# Patient Record
Sex: FEMALE | Race: WHITE | HISPANIC OR LATINO | URBAN - METROPOLITAN AREA
[De-identification: names, ages, dates, MRNs, and addresses within clinical notes are randomized per-mention and may not be internally consistent; named-entity substitution may affect disease eponyms.]

---

## 2017-10-24 ENCOUNTER — EMERGENCY (EMERGENCY)
Facility: HOSPITAL | Age: 27
LOS: 0 days | Discharge: HOME | End: 2017-10-24
Admitting: INTERNAL MEDICINE

## 2017-10-24 DIAGNOSIS — N89.8 OTHER SPECIFIED NONINFLAMMATORY DISORDERS OF VAGINA: ICD-10-CM

## 2017-10-24 DIAGNOSIS — R10.2 PELVIC AND PERINEAL PAIN: ICD-10-CM

## 2017-10-24 DIAGNOSIS — Z97.5 PRESENCE OF (INTRAUTERINE) CONTRACEPTIVE DEVICE: ICD-10-CM

## 2017-12-26 ENCOUNTER — OUTPATIENT (OUTPATIENT)
Dept: OUTPATIENT SERVICES | Facility: HOSPITAL | Age: 27
LOS: 1 days | Discharge: HOME | End: 2017-12-26

## 2017-12-26 DIAGNOSIS — R55 SYNCOPE AND COLLAPSE: ICD-10-CM

## 2017-12-26 DIAGNOSIS — R94.31 ABNORMAL ELECTROCARDIOGRAM [ECG] [EKG]: ICD-10-CM

## 2018-05-08 ENCOUNTER — EMERGENCY (EMERGENCY)
Facility: HOSPITAL | Age: 28
LOS: 0 days | Discharge: HOME | End: 2018-05-08
Attending: EMERGENCY MEDICINE | Admitting: EMERGENCY MEDICINE

## 2018-05-08 VITALS
OXYGEN SATURATION: 98 % | RESPIRATION RATE: 18 BRPM | SYSTOLIC BLOOD PRESSURE: 101 MMHG | HEART RATE: 59 BPM | TEMPERATURE: 99 F | DIASTOLIC BLOOD PRESSURE: 54 MMHG

## 2018-05-08 DIAGNOSIS — S06.0X0A CONCUSSION WITHOUT LOSS OF CONSCIOUSNESS, INITIAL ENCOUNTER: ICD-10-CM
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
## 2018-05-08 DIAGNOSIS — F32.9 MAJOR DEPRESSIVE DISORDER, SINGLE EPISODE, UNSPECIFIED: ICD-10-CM
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
## 2018-05-08 DIAGNOSIS — R55 SYNCOPE AND COLLAPSE: ICD-10-CM
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
## 2018-05-08 LAB
ANION GAP SERPL CALC-SCNC: 17 MMOL/L — HIGH (ref 7–14)
BUN SERPL-MCNC: 8 MG/DL — LOW (ref 10–20)
CALCIUM SERPL-MCNC: 9.2 MG/DL — SIGNIFICANT CHANGE UP (ref 8.5–10.1)
CHLORIDE SERPL-SCNC: 99 MMOL/L — SIGNIFICANT CHANGE UP (ref 98–110)
CO2 SERPL-SCNC: 23 MMOL/L — SIGNIFICANT CHANGE UP (ref 17–32)
CREAT SERPL-MCNC: 0.9 MG/DL — SIGNIFICANT CHANGE UP (ref 0.7–1.5)
GLUCOSE SERPL-MCNC: 84 MG/DL — SIGNIFICANT CHANGE UP (ref 70–99)
HCT VFR BLD CALC: 44.1 % — SIGNIFICANT CHANGE UP (ref 37–47)
HGB BLD-MCNC: 14.5 G/DL — SIGNIFICANT CHANGE UP (ref 12–16)
MCHC RBC-ENTMCNC: 29.8 PG — SIGNIFICANT CHANGE UP (ref 27–31)
MCHC RBC-ENTMCNC: 32.9 G/DL — SIGNIFICANT CHANGE UP (ref 32–37)
MCV RBC AUTO: 90.6 FL — SIGNIFICANT CHANGE UP (ref 81–99)
NRBC # BLD: 0 /100 WBCS — SIGNIFICANT CHANGE UP (ref 0–0)
PLATELET # BLD AUTO: 243 K/UL — SIGNIFICANT CHANGE UP (ref 130–400)
POTASSIUM SERPL-MCNC: 4.1 MMOL/L — SIGNIFICANT CHANGE UP (ref 3.5–5)
POTASSIUM SERPL-SCNC: 4.1 MMOL/L — SIGNIFICANT CHANGE UP (ref 3.5–5)
RBC # BLD: 4.87 M/UL — SIGNIFICANT CHANGE UP (ref 4.2–5.4)
RBC # FLD: 13.2 % — SIGNIFICANT CHANGE UP (ref 11.5–14.5)
SODIUM SERPL-SCNC: 139 MMOL/L — SIGNIFICANT CHANGE UP (ref 135–146)
WBC # BLD: 10.93 K/UL — HIGH (ref 4.8–10.8)
WBC # FLD AUTO: 10.93 K/UL — HIGH (ref 4.8–10.8)

## 2018-05-08 NOTE — ED PROVIDER NOTE - OBJECTIVE STATEMENT
28 y/o female with hx of recurrent unexplained syncope since age 14, anxiety, depression. Had two syncopal episodes over the last week. Both occurred during periods of emotional stress. No preceding palpitations/chest pain/SOB. Hit head both times. Since then c/o H/A, vomiting, difficulty concentrating. No neck pain. No weakness/numbness to extremities.  Had work-up from cardiology and neurology which has been negative.

## 2018-05-08 NOTE — ED ADULT NURSE NOTE - CHPI ED SYMPTOMS NEG
no confusion/no blurred vision/no loss of consciousness/no nausea/no numbness/no vomiting/no weakness/no change in level of consciousness/no fever

## 2018-05-08 NOTE — ED ADULT NURSE NOTE - OBJECTIVE STATEMENT
Patient presents s/p syncope episode last Wednesday and this past Saturday,.  Second episode was witnessed by boyfriend. Patient describes feeling dizzy and then "blacking out". Patient reveals feelings more stressed then usual and was previously worked up for syncope in the past.

## 2018-05-08 NOTE — ED PROVIDER NOTE - MEDICAL DECISION MAKING DETAILS
Labs reviewed. CT with no acute abnormality. Likely concussion. Given head trauma instructions. Will refer to concussion clinic Dr. Parks. Will refer to EP Dr. Mancini for syncope work-up.

## 2018-05-08 NOTE — ED PROVIDER NOTE - PHYSICAL EXAMINATION
Non-toxic in appearance. No pallor, no jaundice. Neck supple, no meningeal signs. Lungs CTA b/l. S1 S2 regular, no murmur. ABD soft, no tenderness. No pedal edema, no calf tenderness. No skin rash. No scalp hematoma. No c-spine tenderness. Extremities with no bony tenderness, no deformity. Back: No vertebral tenderness. Neuro: A&Ox3, GCS 15. CN II-XII intact, strength 5/5 b/l, sensation intact and equal, finger-to-nose intact, negative Rhomberg, normal gait.   A/P: 1. Syncope - will check EKG, labs. Will refer to EP for further work-up. No sign of PE.  2. H/A - likely concussion, R/O ICH. Will obtain head CT.

## 2018-05-11 PROBLEM — Z00.00 ENCOUNTER FOR PREVENTIVE HEALTH EXAMINATION: Status: ACTIVE | Noted: 2018-05-11

## 2018-05-18 ENCOUNTER — EMERGENCY (EMERGENCY)
Facility: HOSPITAL | Age: 28
LOS: 0 days | Discharge: HOME | End: 2018-05-18
Attending: EMERGENCY MEDICINE | Admitting: EMERGENCY MEDICINE

## 2018-05-18 VITALS
OXYGEN SATURATION: 98 % | SYSTOLIC BLOOD PRESSURE: 118 MMHG | DIASTOLIC BLOOD PRESSURE: 68 MMHG | HEIGHT: 63 IN | RESPIRATION RATE: 18 BRPM | HEART RATE: 68 BPM | WEIGHT: 139.99 LBS | TEMPERATURE: 98 F

## 2018-05-18 DIAGNOSIS — N75.0 CYST OF BARTHOLIN'S GLAND: ICD-10-CM

## 2018-05-18 DIAGNOSIS — T85.698A OTHER MECHANICAL COMPLICATION OF OTHER SPECIFIED INTERNAL PROSTHETIC DEVICES, IMPLANTS AND GRAFTS, INITIAL ENCOUNTER: ICD-10-CM
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
## 2018-05-18 LAB
APPEARANCE UR: (no result)
BILIRUB UR-MCNC: NEGATIVE — SIGNIFICANT CHANGE UP
COLOR SPEC: YELLOW — SIGNIFICANT CHANGE UP
DIFF PNL FLD: (no result)
GLUCOSE UR QL: NEGATIVE MG/DL — SIGNIFICANT CHANGE UP
KETONES UR-MCNC: 40
LEUKOCYTE ESTERASE UR-ACNC: (no result)
NITRITE UR-MCNC: NEGATIVE — SIGNIFICANT CHANGE UP
PH UR: 5.5 — SIGNIFICANT CHANGE UP (ref 5–8)
PROT UR-MCNC: NEGATIVE MG/DL — SIGNIFICANT CHANGE UP
SP GR SPEC: 1.02 — SIGNIFICANT CHANGE UP (ref 1.01–1.03)
UROBILINOGEN FLD QL: 0.2 MG/DL — SIGNIFICANT CHANGE UP (ref 0.2–0.2)

## 2018-05-18 NOTE — CONSULT NOTE ADULT - ASSESSMENT
27y P0 s/p left bartholin abscess drainage 2 days ago healing well    - f/u with Dr Walker on Monday to remove packing  - continue antibiotic course  - Sitz baths  - Infection precautions given    Dr Walker aware.

## 2018-05-18 NOTE — ED PROVIDER NOTE - PHYSICAL EXAMINATION
Alert, NAD, WDWN, well-appearing  PERRL, EOMI, normal pupils, no icterus, normal external ENT, pink/moist membranes  Airway intact, Lungs CTAB, no wheezing or rhonchi, normal resp effort w/o tachypnea, no retractions, speaking full sentences  CVS1S2, RRR, no m/g/r, 2+ pulses b/l, warm/well-perfused  Abdomen soft, nondistended, no tenderness on palpation to all 4 quadrants  +Left sided bleeding from the left bartholin gland cyst, small incision noted to be present, about 5mm  FROM all 4 ext, no bony tenderness or obvious deformities  Skin warm/dry, no acute rash

## 2018-05-18 NOTE — ED ADULT NURSE NOTE - OBJECTIVE STATEMENT
Patient present to ED with complains of a word catheter that fell out this morning and it was placed 2 days ago by OBGYN for drainage. Denies fever, abdomen pain, nausea vomiting, dysuria and hematuria.

## 2018-05-18 NOTE — ED PROVIDER NOTE - NS ED ROS FT
Review of Systems:  	•	CONSTITUTIONAL - no fever, no diaphoresis, no chills  	•	SKIN - no rash  	•	RESPIRATORY - no shortness of breath, no cough  	•	CARDIAC - no chest pain, no palpitations  	•	GI - no abd pain, no nausea, no vomiting, no diarrhea  	•	GENITO-URINARY - +word catheter dislodged   	•	MUSCULOSKELETAL - no joint paint, no swelling, no redness  	•	NEUROLOGIC - no weakness, no headache, no paresthesias, no LOC

## 2018-05-18 NOTE — ED PROVIDER NOTE - OBJECTIVE STATEMENT
27 yr old female, PMH of depression and ovarian cysts, presenting for eval of her word catheter placed for her bartholin gland cyst, placed 2 days ago, states the catheter fell out with some bleeding, associated with nausea. Has been taking Augmentin, taken 2 days worth. Placed by Dr. Arias in her clinic. 27 yr old female, PMH of depression and ovarian cysts, presenting for eval of her word catheter placed for her left bartholin gland cyst, placed 2 days ago, states the catheter fell out with some bleeding this morning, associated with nausea. Has been taking Augmentin, taken 2 days worth. Placed by Dr. Arias in her clinic. Denies any fevers, vomiting, diarrhea, dysuria, hematuria.

## 2018-05-18 NOTE — ED PROVIDER NOTE - ATTENDING CONTRIBUTION TO CARE
27 y.o. female comes in because her Word catheter fell out. Was placed 2 days ago by GYN for Bartholin's cyst. Pt on Augmentin. No fever/chills. No CP/SOB. No abdominal pain. No urinary symptoms. GYN evaluated pt and repacked it. Will discharge with GYN follow up.

## 2018-05-18 NOTE — CONSULT NOTE ADULT - SUBJECTIVE AND OBJECTIVE BOX
27y P0 s/p left bartholin abscess drainage and placement of Word catheter 2days ago currently on Augmentin BID presents to ED because catheter fell this morning. She denies fever/chills/urinary symptoms, vaginal discharge/bleeding. Reports pain in the area is better. This is second time she has a Bartholin abscess.    PAST MEDICAL & SURGICAL HISTORY:  h/o Anxiety/depression  h/o vaginal bladder repair  h/o D&C for TOP    OBHx: etopx1  GYNHx: h/o abnormal paps s/p colpo, normal biopsies per patient. Denies h/o STIs, fibroids, cysts. Regular periods.     Meds: augmentin BID (D2/7)  Allergies: NKDA  SHx: Denies tobacco, alcohol, illicit drug use  FHX: non contributory    Vital Signs  T(C): 36.4  T(F): 97.6   HR: 68   BP: 118/68  RR: 18   SpO2: 98%    GEN: NAD alert and oriented  Pelvic: area of drainage in the left vaginal mucosa identified, small area of induration. Area irrigated with normal saline and packed with 1/4" packing.  No signs of expanding/alarming infection.

## 2018-05-19 LAB
CULTURE RESULTS: NO GROWTH — SIGNIFICANT CHANGE UP
SPECIMEN SOURCE: SIGNIFICANT CHANGE UP

## 2018-05-31 ENCOUNTER — APPOINTMENT (OUTPATIENT)
Dept: CARDIOLOGY | Facility: CLINIC | Age: 28
End: 2018-05-31

## 2018-05-31 VITALS
BODY MASS INDEX: 23.57 KG/M2 | HEIGHT: 63 IN | WEIGHT: 133 LBS | SYSTOLIC BLOOD PRESSURE: 120 MMHG | DIASTOLIC BLOOD PRESSURE: 80 MMHG

## 2018-05-31 VITALS — HEART RATE: 54 BPM

## 2018-07-03 ENCOUNTER — OUTPATIENT (OUTPATIENT)
Dept: OUTPATIENT SERVICES | Facility: HOSPITAL | Age: 28
LOS: 1 days | Discharge: HOME | End: 2018-07-03

## 2018-07-03 DIAGNOSIS — R55 SYNCOPE AND COLLAPSE: ICD-10-CM

## 2018-07-03 NOTE — CHART NOTE - NSCHARTNOTEFT_GEN_A_CORE
Cardiac Electrophysiology Procedure Note    Procedure:  Medtronic  Implantable Loop Recorder Implant    Indication: syncope  Attending:	Miguel Mancini MD    EQUIPMENT IMPLANTED      Medtronic Linq  Serial Number  [id number]        DESCRIPTION OF PROCEDURE  The patient was brought to the Procedure Room in a nonsedated and fasting state, having received preoperative antibiotics. Informed, written consent was obtained prior to the procedure.  The left shoulder region was cleaned and prepped with serial applications of Chlorhexidine. Patient was then covered with sterile drapes in the usual manner. Blood pressure, oxygenation and level of comfort were stable throughout.   	The left deltopectoral groove region was defined; 10 cc of lidocaine solution were infiltrated into the skin overlying the left deltopectoral groove. A 5 mm. incision was then made. The loop recorder was injected under the skin..     The wound margins approximated well, without any tension or overlap. The wound was closed using dermabond  A dry, sterile dressing was placed over this.     COMPLICATIONS:  The patient tolerated the procedure well. There were no immediate complications.    CONCLUSIONS:  Successful implant of loop recorder.

## 2018-07-03 NOTE — PROGRESS NOTE ADULT - SUBJECTIVE AND OBJECTIVE BOX
Electrophysiology Brief Post-Op Note    I have personally seen and examined the patient.  I agree with the history and physical which I have reviewed and noted any changes below.  07-03-18 @ 17:37    PRE-OP DIAGNOSIS: syncope    POST-OP DIAGNOSIS: syncope    PROCEDURE: loop implant    Physician: Addis  Assistant: None    ESTIMATED BLOOD LOSS: 1      mL    ANESTHESIA TYPE:  [  ]General Anesthesia  [  ] Sedation  [X  ] Local/Regional    CONDITION  [  ] Critical  [  ] Serious  [  ]Fair  [ X ]Good      SPECIMENS REMOVED (IF APPLICABLE):  none    IMPLANTS (IF APPLICABLE)  loop    FINDINGS  PLAN OF CARE  - FU 3-4 weeks Electrophysiology Brief Post-Op Note    I have personally seen and examined the patient.  I agree with the history and physical which I have reviewed and noted any changes below.  07-03-18 @ 16:00    PRE-OP DIAGNOSIS: syncope    POST-OP DIAGNOSIS: syncope    PROCEDURE: loop implant    Physician: Addis  Assistant: None    ESTIMATED BLOOD LOSS: 1      mL    ANESTHESIA TYPE:  [  ]General Anesthesia  [  ] Sedation  [X  ] Local/Regional    CONDITION  [  ] Critical  [  ] Serious  [  ]Fair  [ X ]Good      SPECIMENS REMOVED (IF APPLICABLE):  none    IMPLANTS (IF APPLICABLE)  loop    FINDINGS  PLAN OF CARE  - FU 3-4 weeks

## 2018-07-06 DIAGNOSIS — F41.8 OTHER SPECIFIED ANXIETY DISORDERS: ICD-10-CM

## 2018-07-09 ENCOUNTER — EMERGENCY (EMERGENCY)
Facility: HOSPITAL | Age: 28
LOS: 0 days | Discharge: HOME | End: 2018-07-09
Attending: EMERGENCY MEDICINE | Admitting: EMERGENCY MEDICINE

## 2018-07-09 VITALS
TEMPERATURE: 98 F | DIASTOLIC BLOOD PRESSURE: 69 MMHG | RESPIRATION RATE: 18 BRPM | HEART RATE: 75 BPM | OXYGEN SATURATION: 98 % | SYSTOLIC BLOOD PRESSURE: 113 MMHG

## 2018-07-09 DIAGNOSIS — R55 SYNCOPE AND COLLAPSE: ICD-10-CM

## 2018-07-09 DIAGNOSIS — R51 HEADACHE: ICD-10-CM

## 2018-07-09 DIAGNOSIS — H53.149 VISUAL DISCOMFORT, UNSPECIFIED: ICD-10-CM

## 2018-07-09 DIAGNOSIS — F41.8 OTHER SPECIFIED ANXIETY DISORDERS: ICD-10-CM

## 2018-07-09 LAB
ALBUMIN SERPL ELPH-MCNC: 4.7 G/DL — SIGNIFICANT CHANGE UP (ref 3.5–5.2)
ALP SERPL-CCNC: 57 U/L — SIGNIFICANT CHANGE UP (ref 30–115)
ALT FLD-CCNC: 8 U/L — SIGNIFICANT CHANGE UP (ref 0–41)
ANION GAP SERPL CALC-SCNC: 16 MMOL/L — HIGH (ref 7–14)
AST SERPL-CCNC: 25 U/L — SIGNIFICANT CHANGE UP (ref 0–41)
BASOPHILS # BLD AUTO: 0.02 K/UL — SIGNIFICANT CHANGE UP (ref 0–0.2)
BASOPHILS NFR BLD AUTO: 0.2 % — SIGNIFICANT CHANGE UP (ref 0–1)
BILIRUB DIRECT SERPL-MCNC: <0.2 MG/DL — SIGNIFICANT CHANGE UP (ref 0–0.2)
BILIRUB INDIRECT FLD-MCNC: >0.3 MG/DL — SIGNIFICANT CHANGE UP (ref 0.2–1.2)
BILIRUB SERPL-MCNC: 0.5 MG/DL — SIGNIFICANT CHANGE UP (ref 0.2–1.2)
BUN SERPL-MCNC: 7 MG/DL — LOW (ref 10–20)
CALCIUM SERPL-MCNC: 9.6 MG/DL — SIGNIFICANT CHANGE UP (ref 8.5–10.1)
CHLORIDE SERPL-SCNC: 101 MMOL/L — SIGNIFICANT CHANGE UP (ref 98–110)
CO2 SERPL-SCNC: 22 MMOL/L — SIGNIFICANT CHANGE UP (ref 17–32)
CREAT SERPL-MCNC: 0.8 MG/DL — SIGNIFICANT CHANGE UP (ref 0.7–1.5)
EOSINOPHIL # BLD AUTO: 0.01 K/UL — SIGNIFICANT CHANGE UP (ref 0–0.7)
EOSINOPHIL NFR BLD AUTO: 0.1 % — SIGNIFICANT CHANGE UP (ref 0–8)
GLUCOSE SERPL-MCNC: 109 MG/DL — HIGH (ref 70–99)
HCT VFR BLD CALC: 41.8 % — SIGNIFICANT CHANGE UP (ref 37–47)
HGB BLD-MCNC: 14.2 G/DL — SIGNIFICANT CHANGE UP (ref 12–16)
IMM GRANULOCYTES NFR BLD AUTO: 0.3 % — SIGNIFICANT CHANGE UP (ref 0.1–0.3)
LYMPHOCYTES # BLD AUTO: 0.92 K/UL — LOW (ref 1.2–3.4)
LYMPHOCYTES # BLD AUTO: 9.5 % — LOW (ref 20.5–51.1)
MCHC RBC-ENTMCNC: 30.3 PG — SIGNIFICANT CHANGE UP (ref 27–31)
MCHC RBC-ENTMCNC: 34 G/DL — SIGNIFICANT CHANGE UP (ref 32–37)
MCV RBC AUTO: 89.1 FL — SIGNIFICANT CHANGE UP (ref 81–99)
MONOCYTES # BLD AUTO: 0.46 K/UL — SIGNIFICANT CHANGE UP (ref 0.1–0.6)
MONOCYTES NFR BLD AUTO: 4.7 % — SIGNIFICANT CHANGE UP (ref 1.7–9.3)
NEUTROPHILS # BLD AUTO: 8.26 K/UL — HIGH (ref 1.4–6.5)
NEUTROPHILS NFR BLD AUTO: 85.2 % — HIGH (ref 42.2–75.2)
NRBC # BLD: 0 /100 WBCS — SIGNIFICANT CHANGE UP (ref 0–0)
PLATELET # BLD AUTO: 220 K/UL — SIGNIFICANT CHANGE UP (ref 130–400)
POTASSIUM SERPL-MCNC: 5.2 MMOL/L — HIGH (ref 3.5–5)
POTASSIUM SERPL-SCNC: 5.2 MMOL/L — HIGH (ref 3.5–5)
PROT SERPL-MCNC: 7.5 G/DL — SIGNIFICANT CHANGE UP (ref 6–8)
RBC # BLD: 4.69 M/UL — SIGNIFICANT CHANGE UP (ref 4.2–5.4)
RBC # FLD: 13.6 % — SIGNIFICANT CHANGE UP (ref 11.5–14.5)
SODIUM SERPL-SCNC: 139 MMOL/L — SIGNIFICANT CHANGE UP (ref 135–146)
TROPONIN T SERPL-MCNC: <0.01 NG/ML — SIGNIFICANT CHANGE UP
WBC # BLD: 9.7 K/UL — SIGNIFICANT CHANGE UP (ref 4.8–10.8)
WBC # FLD AUTO: 9.7 K/UL — SIGNIFICANT CHANGE UP (ref 4.8–10.8)

## 2018-07-09 RX ORDER — METOCLOPRAMIDE HCL 10 MG
10 TABLET ORAL ONCE
Qty: 0 | Refills: 0 | Status: COMPLETED | OUTPATIENT
Start: 2018-07-09 | End: 2018-07-09

## 2018-07-09 RX ORDER — SODIUM CHLORIDE 9 MG/ML
1000 INJECTION INTRAMUSCULAR; INTRAVENOUS; SUBCUTANEOUS ONCE
Qty: 0 | Refills: 0 | Status: COMPLETED | OUTPATIENT
Start: 2018-07-09 | End: 2018-07-09

## 2018-07-09 RX ADMIN — Medication 10 MILLIGRAM(S): at 20:26

## 2018-07-09 RX ADMIN — SODIUM CHLORIDE 1000 MILLILITER(S): 9 INJECTION INTRAMUSCULAR; INTRAVENOUS; SUBCUTANEOUS at 20:26

## 2018-07-09 NOTE — ED PROVIDER NOTE - OBJECTIVE STATEMENT
28 y/o F with PMH of recurrent syncope, wearing implanted loop recorder placed 2 weeks ago, presents today after episode of [place] train today witnessed by friend. Upon awakening pressed record button on loop recorder. Denies prodromal SX. Afterward developed gradual onset HA typical of her migraines, +photophobia, +phonophobia. No fever/chills, HA, neck pain or stiffness, CP, palpitations, SOB, abdominal pain, N/V/D. No trauma. No fall.

## 2018-07-09 NOTE — ED PROVIDER NOTE - PROGRESS NOTE DETAILS
A/P: Will treat HA, contact EP. EKG negative for any pro-arrhythmic changes, shows improvement in prior t-wave changes noted in May. Labs, EKG, reassess. Pt reports improvement in symptoms, currently asymptomatic. Discussed results and provided copy to pt. Pt understands to follow-up with PMD/neurology/EP. Pt understands to return to ED if symptoms return/worsen. Agreeable to discharge. Spoke with Josue from [employer], had report sent over, showed no new events on loop recorder-provided copy to pt. Pt reports improvement in symptoms, currently asymptomatic. Discussed results and provided copy to pt. Pt understands to follow-up with PMD/neurology/EP. Pt understands to return to ED if symptoms return/worsen. Agreeable to discharge.

## 2018-07-09 NOTE — ED PROVIDER NOTE - NS ED ROS FT
Constitutional: See HPI. No fever/chills.  Eyes: No visual changes, eye pain or discharge.  ENT: No hearing changes, pain, discharge or infections.  Neck: No neck pain or stiffness.  Cardiac: No chest pain, SOB or edema. No chest pain with exertion.  Respiratory: No cough or respiratory distress. No hemoptysis.   GI: No nausea, vomiting, diarrhea or abdominal pain.  MS: No myalgia, muscle weakness, joint pain or back pain.  Neuro: (+)Syncope. (+)HA. no muscle weakness.   Skin: No rash.  Endocrine: No history of thyroid disease or diabetes.  Except as documented in the HPI, all other systems are negative.

## 2018-07-09 NOTE — ED ADULT NURSE NOTE - OBJECTIVE STATEMENT
Pt c/o of syncope episode. Pt states she has syncope episodes similar to this since she was 14 years old. Pt recently had loop implant placed.

## 2018-07-09 NOTE — ED PROVIDER NOTE - PHYSICAL EXAMINATION
On exam: Pt is non-toxic, WA, sitting comfortably in NAD. NCAT. PERRL, EOMI. MMM, OP clear. S1S2 regular, no MRG. Lungs CTAB, no WRC. Abdomen is soft, NT/ND. No rash. FROM and strength 5/5 x4 extremities, sensation intact and equal. CN2-12 intact. Normal gait. Negative Romberg. No pronator drift. Normal finger to nose b/l.

## 2018-07-10 VITALS
RESPIRATION RATE: 18 BRPM | SYSTOLIC BLOOD PRESSURE: 102 MMHG | OXYGEN SATURATION: 97 % | DIASTOLIC BLOOD PRESSURE: 51 MMHG | TEMPERATURE: 98 F | HEART RATE: 55 BPM

## 2018-07-10 RX ORDER — PROCHLORPERAZINE MALEATE 5 MG
10 TABLET ORAL ONCE
Qty: 0 | Refills: 0 | Status: COMPLETED | OUTPATIENT
Start: 2018-07-10 | End: 2018-07-10

## 2018-07-10 RX ORDER — ACETAMINOPHEN 500 MG
650 TABLET ORAL ONCE
Qty: 0 | Refills: 0 | Status: COMPLETED | OUTPATIENT
Start: 2018-07-10 | End: 2018-07-10

## 2018-07-10 RX ORDER — METOCLOPRAMIDE HCL 10 MG
1 TABLET ORAL
Qty: 15 | Refills: 0 | OUTPATIENT
Start: 2018-07-10 | End: 2018-07-14

## 2018-07-10 RX ADMIN — Medication 650 MILLIGRAM(S): at 00:59

## 2018-07-10 RX ADMIN — Medication 104 MILLIGRAM(S): at 00:59

## 2018-08-29 ENCOUNTER — APPOINTMENT (OUTPATIENT)
Dept: CARDIOLOGY | Facility: CLINIC | Age: 28
End: 2018-08-29

## 2018-08-29 VITALS
WEIGHT: 118 LBS | HEART RATE: 77 BPM | SYSTOLIC BLOOD PRESSURE: 105 MMHG | OXYGEN SATURATION: 96 % | DIASTOLIC BLOOD PRESSURE: 67 MMHG | BODY MASS INDEX: 20.9 KG/M2

## 2018-08-29 DIAGNOSIS — Z78.9 OTHER SPECIFIED HEALTH STATUS: ICD-10-CM

## 2018-08-29 DIAGNOSIS — Z82.49 FAMILY HISTORY OF ISCHEMIC HEART DISEASE AND OTHER DISEASES OF THE CIRCULATORY SYSTEM: ICD-10-CM

## 2018-08-29 PROBLEM — F32.89 OTHER SPECIFIED DEPRESSIVE EPISODES: Chronic | Status: ACTIVE | Noted: 2018-05-08

## 2018-08-29 PROBLEM — F41.9 ANXIETY DISORDER, UNSPECIFIED: Chronic | Status: ACTIVE | Noted: 2018-05-08

## 2018-10-30 NOTE — ED PROVIDER NOTE - NS_EDPROVIDERDISPOUSERTYPE_ED_A_ED
Fax from pharmacy  Patient is currently paying $81 OOP for chlorthalidone and is requesting a lower cost alternative.  Hydrochlorothiazide  $6 for 90 days  Indapamide $18 for 90 days    OK for change?  Please advise back to triage/pod.    LOV today 10/30/18 RT Reginald (R)     Attending Attestation (For Attendings USE Only)...

## 2018-11-02 ENCOUNTER — APPOINTMENT (OUTPATIENT)
Dept: CARDIOLOGY | Facility: CLINIC | Age: 28
End: 2018-11-02

## 2018-11-21 ENCOUNTER — RESULT REVIEW (OUTPATIENT)
Age: 28
End: 2018-11-21

## 2018-12-27 ENCOUNTER — CHART COPY (OUTPATIENT)
Age: 28
End: 2018-12-27

## 2019-04-01 ENCOUNTER — OUTPATIENT (OUTPATIENT)
Dept: OUTPATIENT SERVICES | Facility: HOSPITAL | Age: 29
LOS: 1 days | End: 2019-04-01
Payer: MEDICAID

## 2019-04-01 PROCEDURE — G9001: CPT

## 2019-04-04 ENCOUNTER — APPOINTMENT (OUTPATIENT)
Dept: CARDIOLOGY | Facility: CLINIC | Age: 29
End: 2019-04-04
Payer: MEDICAID

## 2019-04-04 PROCEDURE — 93299: CPT

## 2019-04-04 PROCEDURE — 93298 REM INTERROG DEV EVAL SCRMS: CPT

## 2019-04-22 DIAGNOSIS — Z71.89 OTHER SPECIFIED COUNSELING: ICD-10-CM

## 2019-05-06 ENCOUNTER — APPOINTMENT (OUTPATIENT)
Dept: CARDIOLOGY | Facility: CLINIC | Age: 29
End: 2019-05-06
Payer: MEDICAID

## 2019-05-06 PROCEDURE — 93298 REM INTERROG DEV EVAL SCRMS: CPT

## 2019-05-06 PROCEDURE — 93299: CPT

## 2019-06-07 ENCOUNTER — APPOINTMENT (OUTPATIENT)
Dept: CARDIOLOGY | Facility: CLINIC | Age: 29
End: 2019-06-07
Payer: MEDICAID

## 2019-06-07 PROCEDURE — 93299: CPT

## 2019-06-07 PROCEDURE — 93298 REM INTERROG DEV EVAL SCRMS: CPT

## 2019-07-09 ENCOUNTER — APPOINTMENT (OUTPATIENT)
Dept: CARDIOLOGY | Facility: CLINIC | Age: 29
End: 2019-07-09
Payer: MEDICAID

## 2019-07-09 PROCEDURE — 93298 REM INTERROG DEV EVAL SCRMS: CPT

## 2019-07-09 PROCEDURE — 93299: CPT

## 2019-08-12 ENCOUNTER — APPOINTMENT (OUTPATIENT)
Dept: CARDIOLOGY | Facility: CLINIC | Age: 29
End: 2019-08-12
Payer: MEDICAID

## 2019-08-12 PROCEDURE — 93299: CPT

## 2019-08-12 PROCEDURE — 93298 REM INTERROG DEV EVAL SCRMS: CPT

## 2019-09-12 ENCOUNTER — APPOINTMENT (OUTPATIENT)
Dept: CARDIOLOGY | Facility: CLINIC | Age: 29
End: 2019-09-12
Payer: MEDICAID

## 2019-09-12 PROCEDURE — 93299: CPT

## 2019-09-12 PROCEDURE — 93298 REM INTERROG DEV EVAL SCRMS: CPT

## 2019-10-14 ENCOUNTER — APPOINTMENT (OUTPATIENT)
Dept: CARDIOLOGY | Facility: CLINIC | Age: 29
End: 2019-10-14
Payer: MEDICAID

## 2019-10-14 PROCEDURE — 93299: CPT

## 2019-10-14 PROCEDURE — 93298 REM INTERROG DEV EVAL SCRMS: CPT

## 2019-11-15 ENCOUNTER — APPOINTMENT (OUTPATIENT)
Dept: CARDIOLOGY | Facility: CLINIC | Age: 29
End: 2019-11-15
Payer: MEDICAID

## 2019-11-15 PROCEDURE — 93298 REM INTERROG DEV EVAL SCRMS: CPT

## 2019-11-15 PROCEDURE — 93299: CPT

## 2019-12-16 ENCOUNTER — APPOINTMENT (OUTPATIENT)
Dept: CARDIOLOGY | Facility: CLINIC | Age: 29
End: 2019-12-16
Payer: MEDICAID

## 2019-12-16 PROCEDURE — 93299: CPT

## 2019-12-16 PROCEDURE — 93298 REM INTERROG DEV EVAL SCRMS: CPT

## 2020-01-16 ENCOUNTER — RESULT REVIEW (OUTPATIENT)
Age: 30
End: 2020-01-16

## 2020-01-17 ENCOUNTER — APPOINTMENT (OUTPATIENT)
Dept: CARDIOLOGY | Facility: CLINIC | Age: 30
End: 2020-01-17
Payer: MEDICAID

## 2020-01-17 PROCEDURE — G2066: CPT

## 2020-01-17 PROCEDURE — 93298 REM INTERROG DEV EVAL SCRMS: CPT

## 2020-02-18 ENCOUNTER — APPOINTMENT (OUTPATIENT)
Dept: CARDIOLOGY | Facility: CLINIC | Age: 30
End: 2020-02-18
Payer: MEDICAID

## 2020-02-18 VITALS
HEIGHT: 64 IN | HEART RATE: 69 BPM | BODY MASS INDEX: 23.9 KG/M2 | DIASTOLIC BLOOD PRESSURE: 64 MMHG | SYSTOLIC BLOOD PRESSURE: 107 MMHG | WEIGHT: 140 LBS

## 2020-02-18 DIAGNOSIS — Z45.09 ENCOUNTER FOR ADJUSTMENT AND MANAGEMENT OF OTHER CARDIAC DEVICE: ICD-10-CM

## 2020-02-18 PROCEDURE — 99213 OFFICE O/P EST LOW 20 MIN: CPT

## 2020-02-18 PROCEDURE — 93289 INTERROG DEVICE EVAL HEART: CPT

## 2020-02-18 RX ORDER — VORTIOXETINE 5 MG/1
5 TABLET, FILM COATED ORAL DAILY
Refills: 0 | Status: COMPLETED | COMMUNITY
End: 2020-02-18

## 2020-02-18 RX ORDER — VORTIOXETINE 10 MG/1
10 TABLET, FILM COATED ORAL DAILY
Refills: 0 | Status: COMPLETED | COMMUNITY
End: 2020-02-18

## 2020-02-18 NOTE — PHYSICAL EXAM
[General Appearance - Well Developed] : well developed [General Appearance - Well Nourished] : well nourished [Heart Rate And Rhythm] : heart rate and rhythm were normal [Murmurs] : no murmurs present [Heart Sounds] : normal S1 and S2 [Arterial Pulses Normal] : the arterial pulses were normal [Exaggerated Use Of Accessory Muscles For Inspiration] : no accessory muscle use [Respiration, Rhythm And Depth] : normal respiratory rhythm and effort [Clean] : clean [Dry] : dry [Auscultation Breath Sounds / Voice Sounds] : lungs were clear to auscultation bilaterally [Bowel Sounds] : normal bowel sounds [Well-Healed] : well-healed [Abdomen Soft] : soft [Abdomen Tenderness] : non-tender [Abdomen Mass (___ Cm)] : no abdominal mass palpated [Nail Clubbing] : no clubbing of the fingernails [Cyanosis, Localized] : no localized cyanosis [Nail Splinter Hemorrhages] : no splinter hemorrhages of the nails [Petechial Hemorrhages (___cm)] : no petechial hemorrhages [] : no ischemic changes [Normal Oral Mucosa] : normal oral mucosa [No Oral Pallor] : no oral pallor [No Oral Cyanosis] : no oral cyanosis

## 2020-03-20 ENCOUNTER — APPOINTMENT (OUTPATIENT)
Dept: CARDIOLOGY | Facility: CLINIC | Age: 30
End: 2020-03-20
Payer: MEDICAID

## 2020-03-20 PROCEDURE — G2066: CPT

## 2020-03-20 PROCEDURE — 93298 REM INTERROG DEV EVAL SCRMS: CPT

## 2020-03-25 NOTE — PROCEDURE
[No] : not [NSR] : normal sinus rhythm [Sensing Amplitude ___mv] : sensing amplitude was [unfilled] mv [de-identified] : Medtronic [de-identified] : LNQ11 [de-identified] : [id number] [de-identified] : 7/3/18 [de-identified] : GOOD [de-identified] : 25 Kash episodes, all between 11pm- 8:30am. V rate average <30-40 bpm.\par Kash detection reprogrammed to 30 bpm, to avoid nocturnal bradycardia storage .

## 2020-03-25 NOTE — ASSESSMENT
[FreeTextEntry1] : 26 y/o female with  pmh of  depression,  anxiety, recurrent multiple episodes of syncope, 3 times a year . \par First episode of syncope when she was 14 years old .  Sustained several lacerations to to  her face from syncope/ falls .  \par Prior neurology work up was negative\par \par \par A/P \par 1. Recurrent Syncope  - etiology bradycardia vs vasovagal. S/P ILR  7/30/2018 \par \par Interrogation of the loop recorder today- Normal Function, Nocturnal bradycardia. NO arrhythmia's .   \par  Device reprogrammed ,see device section for details. \par  \par Reinforced to keep fluid intake >2 L/day , heart healthy , small frequent meals.  \par \par \par Plan \par -Continue remote monitoring. \par -F/U with neurology as scheduled.  \par -continue current meds for depression / anxiety . \par -F/U in 9 months. \par \par

## 2020-04-20 ENCOUNTER — APPOINTMENT (OUTPATIENT)
Dept: CARDIOLOGY | Facility: CLINIC | Age: 30
End: 2020-04-20
Payer: MEDICAID

## 2020-04-20 PROCEDURE — G2066: CPT

## 2020-04-20 PROCEDURE — 93298 REM INTERROG DEV EVAL SCRMS: CPT

## 2020-05-21 ENCOUNTER — APPOINTMENT (OUTPATIENT)
Dept: CARDIOLOGY | Facility: CLINIC | Age: 30
End: 2020-05-21
Payer: MEDICAID

## 2020-05-21 PROCEDURE — 93298 REM INTERROG DEV EVAL SCRMS: CPT

## 2020-05-21 PROCEDURE — G2066: CPT

## 2020-06-23 ENCOUNTER — APPOINTMENT (OUTPATIENT)
Dept: CARDIOLOGY | Facility: CLINIC | Age: 30
End: 2020-06-23
Payer: MEDICAID

## 2020-06-23 PROCEDURE — G2066: CPT

## 2020-06-23 PROCEDURE — 93298 REM INTERROG DEV EVAL SCRMS: CPT

## 2020-07-27 ENCOUNTER — APPOINTMENT (OUTPATIENT)
Dept: CARDIOLOGY | Facility: CLINIC | Age: 30
End: 2020-07-27
Payer: MEDICAID

## 2020-07-27 PROCEDURE — 93298 REM INTERROG DEV EVAL SCRMS: CPT

## 2020-07-27 PROCEDURE — G2066: CPT

## 2020-08-31 ENCOUNTER — APPOINTMENT (OUTPATIENT)
Dept: CARDIOLOGY | Facility: CLINIC | Age: 30
End: 2020-08-31
Payer: MEDICAID

## 2020-08-31 PROCEDURE — 93298 REM INTERROG DEV EVAL SCRMS: CPT

## 2020-08-31 PROCEDURE — G2066: CPT

## 2020-09-10 NOTE — HISTORY OF PRESENT ILLNESS
[None] : The patient complains of no symptoms [Palpitations] : no palpitations [SOB] : no dyspnea [Erythema at Site] : no erythema at device site [de-identified] : S/P ILR 7/[id number]  , for long term arrhythmia  monitoring  d/t recurrent syncope \par \par She presents for a follow up . \par \par Denies CP/SOB/ palpitations . Last episode of syncope occurred in 7/2018 . \par She denies dizziness . Reports good activity tolerance . \par \par \par  [FreeTextEntry1] : Echo 11/17 -EF 55%\par ECG (2/18/2020) - SR at 60 bpm, non specific TWI , QTc 390 ms \par MRI (12/26/2017)  negative\par ECG ( 8/29/2018) - SR at 66 bpm , QTc 402 ms \par EKG SR 54 bpm No

## 2020-10-05 ENCOUNTER — APPOINTMENT (OUTPATIENT)
Dept: CARDIOLOGY | Facility: CLINIC | Age: 30
End: 2020-10-05
Payer: MEDICAID

## 2020-10-05 PROCEDURE — 93298 REM INTERROG DEV EVAL SCRMS: CPT

## 2020-10-05 PROCEDURE — G2066: CPT

## 2020-11-11 ENCOUNTER — RESULT REVIEW (OUTPATIENT)
Age: 30
End: 2020-11-11

## 2020-11-17 ENCOUNTER — APPOINTMENT (OUTPATIENT)
Dept: CARDIOLOGY | Facility: CLINIC | Age: 30
End: 2020-11-17
Payer: MEDICAID

## 2020-11-17 VITALS
BODY MASS INDEX: 25.27 KG/M2 | TEMPERATURE: 97.5 F | DIASTOLIC BLOOD PRESSURE: 64 MMHG | SYSTOLIC BLOOD PRESSURE: 107 MMHG | HEART RATE: 79 BPM | HEIGHT: 64 IN | WEIGHT: 148 LBS

## 2020-11-17 PROCEDURE — 99213 OFFICE O/P EST LOW 20 MIN: CPT

## 2020-11-17 PROCEDURE — 99072 ADDL SUPL MATRL&STAF TM PHE: CPT

## 2020-11-17 RX ORDER — BREXPIPRAZOLE 1 MG/1
1 TABLET ORAL DAILY
Refills: 0 | Status: COMPLETED | COMMUNITY
End: 2020-11-17

## 2020-11-17 NOTE — HISTORY OF PRESENT ILLNESS
[None] : The patient complains of no symptoms [Palpitations] : no palpitations [SOB] : no dyspnea [Erythema at Site] : no erythema at device site [de-identified] : S/P ILR 7/150406  , for long term arrhythmia  monitoring  d/t recurrent syncope \par \par She presents for a follow up . \par \par Denies CP/SOB/ palpitations . Last episode of syncope occurred in 7/2018 . \par She denies dizziness . Reports good activity tolerance . \par \par \par  [FreeTextEntry1] : Echo 11/17 -EF 55%\par ECG (2/18/2020) - SR at 60 bpm, non specific TWI , QTc 390 ms \par MRI (12/26/2017)  negative\par ECG ( 8/29/2018) - SR at 66 bpm , QTc 402 ms \par EKG SR 54 bpm

## 2020-11-17 NOTE — ASSESSMENT
[FreeTextEntry1] : 28 y/o female with  pmh of  depression,  anxiety, recurrent multiple episodes of syncope, 3 times a year . \par First episode of syncope when she was 14 years old .  Sustained several lacerations to to  her face from syncope/ falls .  \par Prior neurology work up was negative\par \par \par A/P \par 1. Recurrent Syncope  - etiology bradycardia vs vasovagal. S/P ILR  7/30/2018 \par \par Interrogation of the loop recorder today- Normal Function, Nocturnal bradycardia. NO arrhythmia's .   \par  Device reprogrammed ,see device section for details. \par  \par Reinforced to keep fluid intake >2 L/day , heart healthy , small frequent meals.  \par \par \par Plan \par -Continue remote monitoring. \par -F/U with neurology as scheduled.  \par -continue current meds for depression / anxiety . \par -F/U in 9 months. \par \par

## 2020-11-17 NOTE — PHYSICAL EXAM
[General Appearance - Well Developed] : well developed [General Appearance - Well Nourished] : well nourished [Heart Rate And Rhythm] : heart rate and rhythm were normal [Heart Sounds] : normal S1 and S2 [Murmurs] : no murmurs present [Arterial Pulses Normal] : the arterial pulses were normal [Respiration, Rhythm And Depth] : normal respiratory rhythm and effort [Exaggerated Use Of Accessory Muscles For Inspiration] : no accessory muscle use [Auscultation Breath Sounds / Voice Sounds] : lungs were clear to auscultation bilaterally [Clean] : clean [Dry] : dry [Well-Healed] : well-healed [Bowel Sounds] : normal bowel sounds [Abdomen Soft] : soft [Abdomen Tenderness] : non-tender [Abdomen Mass (___ Cm)] : no abdominal mass palpated [Nail Clubbing] : no clubbing of the fingernails [Cyanosis, Localized] : no localized cyanosis [Petechial Hemorrhages (___cm)] : no petechial hemorrhages [Nail Splinter Hemorrhages] : no splinter hemorrhages of the nails [] : no ischemic changes [Normal Oral Mucosa] : normal oral mucosa [No Oral Pallor] : no oral pallor [No Oral Cyanosis] : no oral cyanosis

## 2020-11-17 NOTE — PROCEDURE
[No] : not [NSR] : normal sinus rhythm [Sensing Amplitude ___mv] : sensing amplitude was [unfilled] mv [de-identified] : Medtronic [de-identified] : LNQ11 [de-identified] : [id number] [de-identified] : 7/3/18 [de-identified] : GOOD [de-identified] : no events

## 2020-12-17 ENCOUNTER — APPOINTMENT (OUTPATIENT)
Dept: CARDIOLOGY | Facility: CLINIC | Age: 30
End: 2020-12-17
Payer: MEDICAID

## 2020-12-17 PROCEDURE — G2066: CPT

## 2020-12-17 PROCEDURE — 93298 REM INTERROG DEV EVAL SCRMS: CPT

## 2021-01-21 ENCOUNTER — APPOINTMENT (OUTPATIENT)
Dept: CARDIOLOGY | Facility: CLINIC | Age: 31
End: 2021-01-21
Payer: MEDICAID

## 2021-01-21 PROCEDURE — 93298 REM INTERROG DEV EVAL SCRMS: CPT

## 2021-01-21 PROCEDURE — G2066: CPT

## 2021-02-25 ENCOUNTER — APPOINTMENT (OUTPATIENT)
Dept: CARDIOLOGY | Facility: CLINIC | Age: 31
End: 2021-02-25
Payer: MEDICAID

## 2021-02-25 PROCEDURE — 93298 REM INTERROG DEV EVAL SCRMS: CPT

## 2021-02-25 PROCEDURE — G2066: CPT

## 2021-03-05 ENCOUNTER — NON-APPOINTMENT (OUTPATIENT)
Age: 31
End: 2021-03-05

## 2021-04-01 ENCOUNTER — NON-APPOINTMENT (OUTPATIENT)
Age: 31
End: 2021-04-01

## 2021-04-01 ENCOUNTER — APPOINTMENT (OUTPATIENT)
Dept: CARDIOLOGY | Facility: CLINIC | Age: 31
End: 2021-04-01
Payer: MEDICAID

## 2021-04-01 PROCEDURE — 93298 REM INTERROG DEV EVAL SCRMS: CPT

## 2021-04-01 PROCEDURE — G2066: CPT

## 2021-04-20 ENCOUNTER — APPOINTMENT (OUTPATIENT)
Dept: CARDIOLOGY | Facility: CLINIC | Age: 31
End: 2021-04-20

## 2021-05-17 ENCOUNTER — RESULT REVIEW (OUTPATIENT)
Age: 31
End: 2021-05-17

## 2021-05-21 ENCOUNTER — APPOINTMENT (OUTPATIENT)
Dept: CARDIOLOGY | Facility: CLINIC | Age: 31
End: 2021-05-21
Payer: MEDICAID

## 2021-05-21 ENCOUNTER — NON-APPOINTMENT (OUTPATIENT)
Age: 31
End: 2021-05-21

## 2021-05-21 PROCEDURE — 93298 REM INTERROG DEV EVAL SCRMS: CPT

## 2021-05-21 PROCEDURE — G2066: CPT

## 2021-05-25 ENCOUNTER — FORM ENCOUNTER (OUTPATIENT)
Age: 31
End: 2021-05-25

## 2021-06-25 ENCOUNTER — APPOINTMENT (OUTPATIENT)
Dept: CARDIOLOGY | Facility: CLINIC | Age: 31
End: 2021-06-25
Payer: MEDICAID

## 2021-06-25 ENCOUNTER — NON-APPOINTMENT (OUTPATIENT)
Age: 31
End: 2021-06-25

## 2021-06-25 PROCEDURE — G2066: CPT

## 2021-06-25 PROCEDURE — 93298 REM INTERROG DEV EVAL SCRMS: CPT

## 2021-07-29 ENCOUNTER — NON-APPOINTMENT (OUTPATIENT)
Age: 31
End: 2021-07-29

## 2021-07-29 ENCOUNTER — APPOINTMENT (OUTPATIENT)
Dept: CARDIOLOGY | Facility: CLINIC | Age: 31
End: 2021-07-29
Payer: MEDICAID

## 2021-07-29 PROCEDURE — 93298 REM INTERROG DEV EVAL SCRMS: CPT

## 2021-07-29 PROCEDURE — G2066: CPT

## 2021-09-03 ENCOUNTER — APPOINTMENT (OUTPATIENT)
Dept: CARDIOLOGY | Facility: CLINIC | Age: 31
End: 2021-09-03
Payer: MEDICAID

## 2021-09-03 ENCOUNTER — NON-APPOINTMENT (OUTPATIENT)
Age: 31
End: 2021-09-03

## 2021-09-03 PROCEDURE — G2066: CPT

## 2021-09-03 PROCEDURE — 93298 REM INTERROG DEV EVAL SCRMS: CPT

## 2021-10-08 ENCOUNTER — APPOINTMENT (OUTPATIENT)
Dept: CARDIOLOGY | Facility: CLINIC | Age: 31
End: 2021-10-08
Payer: MEDICAID

## 2021-10-08 ENCOUNTER — NON-APPOINTMENT (OUTPATIENT)
Age: 31
End: 2021-10-08

## 2021-10-08 PROCEDURE — G2066: CPT | Mod: NC

## 2021-10-08 PROCEDURE — 93298 REM INTERROG DEV EVAL SCRMS: CPT

## 2021-11-10 ENCOUNTER — APPOINTMENT (OUTPATIENT)
Dept: CARDIOLOGY | Facility: CLINIC | Age: 31
End: 2021-11-10
Payer: MEDICAID

## 2021-11-10 ENCOUNTER — NON-APPOINTMENT (OUTPATIENT)
Age: 31
End: 2021-11-10

## 2021-11-10 PROCEDURE — 93298 REM INTERROG DEV EVAL SCRMS: CPT | Mod: NC

## 2021-11-10 PROCEDURE — G2066: CPT

## 2021-12-15 ENCOUNTER — APPOINTMENT (OUTPATIENT)
Dept: CARDIOLOGY | Facility: CLINIC | Age: 31
End: 2021-12-15
Payer: MEDICAID

## 2021-12-15 ENCOUNTER — NON-APPOINTMENT (OUTPATIENT)
Age: 31
End: 2021-12-15

## 2021-12-15 PROCEDURE — G2066: CPT

## 2021-12-15 PROCEDURE — 93298 REM INTERROG DEV EVAL SCRMS: CPT

## 2022-01-19 ENCOUNTER — NON-APPOINTMENT (OUTPATIENT)
Age: 32
End: 2022-01-19

## 2022-01-19 ENCOUNTER — APPOINTMENT (OUTPATIENT)
Dept: CARDIOLOGY | Facility: CLINIC | Age: 32
End: 2022-01-19
Payer: MEDICAID

## 2022-01-19 PROCEDURE — 93298 REM INTERROG DEV EVAL SCRMS: CPT

## 2022-01-19 PROCEDURE — G2066: CPT

## 2022-02-23 ENCOUNTER — NON-APPOINTMENT (OUTPATIENT)
Age: 32
End: 2022-02-23

## 2022-02-23 ENCOUNTER — FORM ENCOUNTER (OUTPATIENT)
Age: 32
End: 2022-02-23

## 2022-02-23 ENCOUNTER — APPOINTMENT (OUTPATIENT)
Dept: CARDIOLOGY | Facility: CLINIC | Age: 32
End: 2022-02-23
Payer: MEDICAID

## 2022-02-23 PROCEDURE — 93298 REM INTERROG DEV EVAL SCRMS: CPT

## 2022-02-23 PROCEDURE — G2066: CPT

## 2022-03-30 ENCOUNTER — FORM ENCOUNTER (OUTPATIENT)
Age: 32
End: 2022-03-30

## 2022-03-30 ENCOUNTER — APPOINTMENT (OUTPATIENT)
Dept: CARDIOLOGY | Facility: CLINIC | Age: 32
End: 2022-03-30

## 2022-04-08 ENCOUNTER — APPOINTMENT (OUTPATIENT)
Dept: CARDIOLOGY | Facility: CLINIC | Age: 32
End: 2022-04-08
Payer: MEDICAID

## 2022-04-08 ENCOUNTER — NON-APPOINTMENT (OUTPATIENT)
Age: 32
End: 2022-04-08

## 2022-04-08 PROCEDURE — 93298 REM INTERROG DEV EVAL SCRMS: CPT

## 2022-04-08 PROCEDURE — G2066: CPT

## 2022-05-06 ENCOUNTER — APPOINTMENT (OUTPATIENT)
Dept: CARDIOLOGY | Facility: CLINIC | Age: 32
End: 2022-05-06
Payer: MEDICAID

## 2022-05-06 VITALS
TEMPERATURE: 98 F | BODY MASS INDEX: 24.24 KG/M2 | RESPIRATION RATE: 16 BRPM | HEIGHT: 64 IN | DIASTOLIC BLOOD PRESSURE: 69 MMHG | WEIGHT: 142 LBS | HEART RATE: 73 BPM | SYSTOLIC BLOOD PRESSURE: 102 MMHG

## 2022-05-06 DIAGNOSIS — R55 SYNCOPE AND COLLAPSE: ICD-10-CM

## 2022-05-06 DIAGNOSIS — R42 DIZZINESS AND GIDDINESS: ICD-10-CM

## 2022-05-06 PROCEDURE — 99215 OFFICE O/P EST HI 40 MIN: CPT | Mod: 57

## 2022-05-06 RX ORDER — CLONAZEPAM 0.5 MG/1
0.5 TABLET ORAL DAILY
Refills: 0 | Status: COMPLETED | COMMUNITY
End: 2022-05-06

## 2022-05-06 RX ORDER — VORTIOXETINE 20 MG/1
20 TABLET, FILM COATED ORAL DAILY
Refills: 0 | Status: ACTIVE | COMMUNITY

## 2022-05-08 PROBLEM — R55 SYNCOPE AND COLLAPSE: Status: ACTIVE | Noted: 2018-05-31

## 2022-05-08 PROBLEM — R42 DIZZINESS: Status: ACTIVE | Noted: 2020-02-18

## 2022-05-08 NOTE — PROCEDURE
[No] : not [NSR] : normal sinus rhythm [Sensing Amplitude ___mv] : sensing amplitude was [unfilled] mv [de-identified] : Medtronic [de-identified] : LNQ11 [de-identified] : [id number] [de-identified] : 7/3/18 [de-identified] : GOOD [de-identified] : No events

## 2022-05-08 NOTE — PHYSICAL EXAM
[General Appearance - Well Developed] : well developed [General Appearance - Well Nourished] : well nourished [Heart Rate And Rhythm] : heart rate and rhythm were normal [Heart Sounds] : normal S1 and S2 [Murmurs] : no murmurs present [Arterial Pulses Normal] : the arterial pulses were normal [Respiration, Rhythm And Depth] : normal respiratory rhythm and effort [Exaggerated Use Of Accessory Muscles For Inspiration] : no accessory muscle use [Auscultation Breath Sounds / Voice Sounds] : lungs were clear to auscultation bilaterally [Clean] : clean [Dry] : dry [Well-Healed] : well-healed [Abdomen Soft] : soft [Bowel Sounds] : normal bowel sounds [Abdomen Tenderness] : non-tender [Abdomen Mass (___ Cm)] : no abdominal mass palpated [Nail Clubbing] : no clubbing of the fingernails [Cyanosis, Localized] : no localized cyanosis [Petechial Hemorrhages (___cm)] : no petechial hemorrhages [] : no ischemic changes [Nail Splinter Hemorrhages] : no splinter hemorrhages of the nails [Normal Oral Mucosa] : normal oral mucosa [No Oral Pallor] : no oral pallor [No Oral Cyanosis] : no oral cyanosis

## 2022-05-08 NOTE — HISTORY OF PRESENT ILLNESS
[None] : The patient complains of no symptoms [Palpitations] : no palpitations [SOB] : no dyspnea [Erythema at Site] : no erythema at device site [de-identified] : S/P ILR [id number]  , for long term arrhythmia  monitoring  d/t recurrent syncope \par \par She presents for a follow up . \par \par Denies CP/SOB/ palpitations . Last episode of syncope occurred in 7/2018 . \par She denies dizziness . Reports good activity tolerance . \par \par \par  [FreeTextEntry1] : Echo 11/17 -EF 55%\par ECG (2/18/2020) - SR at 60 bpm, non specific TWI , QTc 390 ms \par MRI (12/26/2017)  negative\par ECG ( 8/29/2018) - SR at 66 bpm , QTc 402 ms \par EKG SR 54 bpm

## 2022-05-08 NOTE — ASSESSMENT
[FreeTextEntry1] : Loop explant reached MOSHE\par \par \par \par I have explained the procedure to the patient.  I have explained the risks and benefits of the procedure to the patient.  There is approximately 1% chance of any major cardiovascular complication to occur. Complications include, but are not limited to infection or bleeding,  The patient understands the risk and would like to proceed with the procedure. Patient indicated that all of his questions were answered to his satisfaction and verbalized understanding.\par

## 2022-05-31 ENCOUNTER — APPOINTMENT (OUTPATIENT)
Dept: CARDIOLOGY | Facility: CLINIC | Age: 32
End: 2022-05-31

## 2022-06-06 ENCOUNTER — OUTPATIENT (OUTPATIENT)
Dept: OUTPATIENT SERVICES | Facility: HOSPITAL | Age: 32
LOS: 1 days | Discharge: HOME | End: 2022-06-06
Payer: MEDICAID

## 2022-06-06 DIAGNOSIS — R00.1 BRADYCARDIA, UNSPECIFIED: ICD-10-CM

## 2022-06-06 PROCEDURE — 33286 RMVL SUBQ CAR RHYTHM MNTR: CPT

## 2022-06-06 RX ORDER — CEPHALEXIN 500 MG
500 CAPSULE ORAL ONCE
Refills: 0 | Status: COMPLETED | OUTPATIENT
Start: 2022-06-06 | End: 2022-06-06

## 2022-06-06 RX ADMIN — Medication 500 MILLIGRAM(S): at 13:58

## 2022-06-06 NOTE — H&P ADULT - HISTORY OF PRESENT ILLNESS
This is a 32 y/o female with PMH anxiety, depression, recurrent syncope s/p ILR (7/2018) who presents for loop explantation, battery at [facility]. No events noted. Pt denies fever, chills, dizziness,  This is a 30 y/o female with PMH anxiety, depression, recurrent syncope s/p ILR (7/2018) who presents for loop explantation, battery at [facility]. No events noted. Pt denies fever, chills, dizziness, chest pain, palpitations, abd pain, n/v/d/c, dysuria or unusual rash.

## 2022-06-06 NOTE — CHART NOTE - NSCHARTNOTEFT_GEN_A_CORE
Cardiac Electrophysiology Procedure Note    Procedure: Medtronic  Implantable Loop Recorder Explant    Indication: Palpitations / Syncope / AFib    Attending: Dr. Mancini 	  Assisting: NP Gatdula    EQUIPMENT EXPLANTED   :   Medtronic Linq  Serial Number  : [id number]    DESCRIPTION OF PROCEDURE  The patient was brought to the Procedure Room in a nonsedated and fasting state, having received preoperative antibiotics. Informed, written consent was obtained prior to the procedure.  The left shoulder region was cleaned and prepped with serial applications of Chlorhexidine. Patient was then covered with sterile drapes in the usual manner. Blood pressure, oxygenation and level of comfort were stable throughout.     	The left parasternal region was defined; 10 cc of lidocaine solution were infiltrated into the skin overlying the left deltopectoral groove. A 5 mm. incision was then made. The loop recorder was removed from under the skin.  The wound margins approximated well, without any tension or overlap. The wound was closed using dermabond. A dry, sterile dressing was placed over this.     COMPLICATIONS:  The patient tolerated the procedure well. There were no immediate complications.    CONCLUSIONS:  Successful explant of loop recorder. Cardiac Electrophysiology Procedure Note    Procedure: Medtronic  Implantable Loop Recorder Explant    Indication: Palpitations     Attending: Dr. Mancini 	  Assisting: NP Gatdula    EQUIPMENT EXPLANTED   :   Medtronic Linq  Serial Number  : [id number]    DESCRIPTION OF PROCEDURE  The patient was brought to the Procedure Room in a nonsedated and fasting state, having received preoperative antibiotics. Informed, written consent was obtained prior to the procedure.  The left shoulder region was cleaned and prepped with serial applications of Chlorhexidine. Patient was then covered with sterile drapes in the usual manner. Blood pressure, oxygenation and level of comfort were stable throughout.     	The left parasternal region was defined; 10 cc of lidocaine solution were infiltrated into the skin overlying the left deltopectoral groove. A 5 mm. incision was then made. The loop recorder was removed from under the skin.  The wound margins approximated well, without any tension or overlap. The wound was closed using dermabond. A dry, sterile dressing was placed over this.     COMPLICATIONS:  The patient tolerated the procedure well. There were no immediate complications.    CONCLUSIONS:  Successful explant of loop recorder.    Attending Attestation  I was physically present for the key portion of the evaluation and management service provide.

## 2022-06-06 NOTE — H&P ADULT - ASSESSMENT
EP: Addis    This is a 30 y/o female with PMH anxiety, depression, recurrent syncope s/p ILR (7/2018) who presents for loop explantation, battery at [facility]. No events noted.    Plan:  - Loop explant today  - Keflex 500 mg PO x 1 now  - Home following procedure  - Resume home meds  - Fu outpatient as needed

## 2022-06-07 NOTE — PROGRESS NOTE ADULT - SUBJECTIVE AND OBJECTIVE BOX
Electrophysiology Brief Post-Op Note    I have personally seen and examined the patient.  I agree with the history and physical which I have reviewed and noted any changes below.  06-06-22 @ 1600    PRE-OP DIAGNOSIS: Syncope    POST-OP DIAGNOSIS:  Syncope    PROCEDURE: Loop Explant    Physician:   Assistant: CHRISTELLE Vanegas    ESTIMATED BLOOD LOSS:  5  mL    ANESTHESIA TYPE:  [  ]General Anesthesia  [  ] Sedation  [X  ] Local/Regional    CONDITION  [  ] Critical  [  ] Serious  [  ]Fair  [ X ]Good    SPECIMENS REMOVED (IF APPLICABLE):  Loop    IMPLANTS (IF APPLICABLE)  None    FINDINGS  PLAN OF CARE  - FU as needed  - Do not get site wet for 2 days  - Remove bandaid in 2 days

## 2022-06-09 DIAGNOSIS — Z45.09 ENCOUNTER FOR ADJUSTMENT AND MANAGEMENT OF OTHER CARDIAC DEVICE: ICD-10-CM

## 2022-06-09 DIAGNOSIS — R00.2 PALPITATIONS: ICD-10-CM

## 2022-07-12 NOTE — ED ADULT NURSE NOTE - FALLEN IN THE PAST
Impression: S/P Cataract Extraction by phacoemulsification with IOL placement; DEXYCU OS - 1 Day. Presence of intraocular lens  Z96.1. Excellent post op course   Post operative instructions reviewed - Plan: --Advised patient to use artificial tears for comfort.
no

## 2023-11-20 NOTE — ED ADULT NURSE NOTE - DISCHARGE DATE/TIME
18-May-2018 09:42 Quinolones Counseling:  I discussed with the patient the risks of fluoroquinolones including but not limited to GI upset, allergic reaction, drug rash, diarrhea, dizziness, photosensitivity, yeast infections, liver function test abnormalities, tendonitis/tendon rupture.

## 2024-10-19 NOTE — H&P ADULT - NSHPPHYSICALEXAM_GEN_ALL_CORE
General: NAD, well appearing  Pulm: CTA bilaterally. No rales, wheezing or rhonchi  Cardiac: S1S2 RRR. Nu rubs, murmurs, or gallops  GI: Soft, non-tender, non-distended. + BS  PV: Warm and well perfused. No clubbing cyanosis or edema  Neuro: AO x4, MURPHY, speech clear  Psych: Normal mood and affect Yes

## 2025-02-03 NOTE — ED ADULT NURSE NOTE - MUSCULOSKELETAL ASSESSMENT
Marcela Castillo, Rachelle BOSCH LPN; Osito Lee MD  Patient confirmed new bronch date/time and arrival time.   WDL